# Patient Record
Sex: FEMALE
[De-identification: names, ages, dates, MRNs, and addresses within clinical notes are randomized per-mention and may not be internally consistent; named-entity substitution may affect disease eponyms.]

---

## 2017-09-30 ENCOUNTER — HOSPITAL ENCOUNTER (EMERGENCY)
Dept: HOSPITAL 25 - UCCORT | Age: 27
Discharge: HOME | End: 2017-09-30
Payer: COMMERCIAL

## 2017-09-30 VITALS — SYSTOLIC BLOOD PRESSURE: 154 MMHG | DIASTOLIC BLOOD PRESSURE: 93 MMHG

## 2017-09-30 DIAGNOSIS — Z88.5: ICD-10-CM

## 2017-09-30 DIAGNOSIS — H60.92: Primary | ICD-10-CM

## 2017-09-30 DIAGNOSIS — E11.9: ICD-10-CM

## 2017-09-30 PROCEDURE — 99202 OFFICE O/P NEW SF 15 MIN: CPT

## 2017-09-30 PROCEDURE — G0463 HOSPITAL OUTPT CLINIC VISIT: HCPCS

## 2017-09-30 NOTE — UC
Skin Complaint HPI





- HPI Summary


HPI Summary: 


concerned the piercing on her left Pinna may be infected-area is tender and a 

little red no drainage.








- History of Current Complaint


Chief Complaint: UCSkin


Time Seen by Provider: 09/30/17 20:00


Stated Complaint: INFECTED PIERCING


Hx Obtained From: Patient


Hx Last Menstrual Period: 9/11/17


Pregnant?: No


Onset/Duration: Sudden Onset, Lasting Days - 2


Timing: Constant


Onset Severity: Mild


Current Severity: Mild


Pain Intensity: 2


Pain Scale Used: 0-10 Numeric


Location: Ear (Left)


Character: Redness


Aggravating Factor(s): Nothing


Alleviating Factor(s): Nothing


Associated Signs & Symptoms: Positive: Negative





- Allergy/Home Medications


Allergies/Adverse Reactions: 


 Allergies











Allergy/AdvReac Type Severity Reaction Status Date / Time


 


Morphine Allergy  Itching Verified 09/30/17 19:46














Review of Systems


Constitutional: Negative


Skin: Other - slight erythema left pinna piercing


Eyes: Negative


ENT: Negative


Respiratory: Negative


Cardiovascular: Negative


Gastrointestinal: Negative


Genitourinary: Negative


Motor: Negative


Neurovascular: Negative


Musculoskeletal: Negative


Neurological: Negative


Psychological: Negative


Is Patient Immunocompromised?: No


All Other Systems Reviewed And Are Negative: Yes





PMH/Surg Hx/FS Hx/Imm Hx


Previously Healthy: No


Endocrine History: Diabetes - diet controlled





- Surgical History


Surgical History: Yes


Surgery Procedure, Year, and Place: Right Oopherectomy and Fallopian Tube 

Removed.  Cyst removed from RIGHT breast





- Family History


Known Family History: Positive: None





- Social History


Occupation: Unemployed


Lives: With Family


Alcohol Use: None


Substance Use Type: None


Smoking Status (MU): Never Smoked Tobacco





- Immunization History


Most Recent Influenza Vaccination: NONE 2017





Physical Exam


Triage Information Reviewed: Yes


Appearance: Well-Appearing, No Pain Distress


Vital Signs: 


 Initial Vital Signs











Temp  98.3 F   09/30/17 19:47


 


Pulse  113   09/30/17 19:47


 


Resp  16   09/30/17 19:47


 


BP  154/93   09/30/17 19:47


 


Pulse Ox  100   09/30/17 19:47











Vital Signs Reviewed: Yes


Eye Exam: Normal


Eyes: Positive: Conjunctiva Clear


ENT Exam: Normal


ENT: Positive: Normal ENT inspection, Hearing grossly normal.  Negative: Nasal 

congestion, Nasal drainage, Trismus, Muffled/hoarse voice


Dental Exam: Normal


Neck exam: Normal


Neck: Positive: Supple, Nontender


Respiratory Exam: Normal


Respiratory: Positive: Chest non-tender, No respiratory distress, No accessory 

muscle use


Cardiovascular Exam: Normal


Cardiovascular: Positive: RRR, No Murmur, Pulses Normal, Brisk Capillary Refill

, Other: - always gets tachy due to anxiety at MD office


Musculoskeletal Exam: Normal


Musculoskeletal: Positive: Strength Intact, ROM Intact


Neurological Exam: Normal


Neurological: Positive: Alert, Muscle Tone Normal


Psychological Exam: Normal


Skin Exam: Normal


Skin: Positive: Other - slight erythema left pinna





Course/Dx





- Course


Course Of Treatment: Bactroban, bactrim, warm compress follow with pcp prn





- Differential Diagnoses - Skin Complaint


Differential Diagnoses: Abscess, Cellulitis, Impetigo, Local Allergic Reaction, 

Santoro-Bernard Syndrome





- Diagnoses


Provider Diagnoses: inflammation left pinna





Discharge





- Discharge Plan


Condition: Stable


Disposition: HOME


Prescriptions: 


Sulfamethox/Trimethoprim DS* [Bactrim /160 TAB*] 1 tab PO BID #14 tab


Patient Education Materials:  Pierced Earlobe Infection (ED), Warm Compress or 

Soak (ED)


Referrals: 


Shannon Oh MD [Medical Doctor] - If Needed

## 2018-01-24 ENCOUNTER — HOSPITAL ENCOUNTER (EMERGENCY)
Dept: HOSPITAL 25 - UCCORT | Age: 28
Discharge: LEFT BEFORE DISCHARGE | End: 2018-01-24
Payer: COMMERCIAL

## 2018-01-24 VITALS — DIASTOLIC BLOOD PRESSURE: 91 MMHG | SYSTOLIC BLOOD PRESSURE: 189 MMHG

## 2018-01-24 DIAGNOSIS — R53.83: ICD-10-CM

## 2018-01-24 DIAGNOSIS — R07.89: Primary | ICD-10-CM

## 2018-01-24 DIAGNOSIS — E11.9: ICD-10-CM

## 2018-01-24 DIAGNOSIS — Z85.43: ICD-10-CM

## 2018-01-24 DIAGNOSIS — Z88.5: ICD-10-CM

## 2018-01-24 DIAGNOSIS — R06.02: ICD-10-CM

## 2018-01-24 DIAGNOSIS — I10: ICD-10-CM

## 2018-01-24 PROCEDURE — G0463 HOSPITAL OUTPT CLINIC VISIT: HCPCS

## 2018-01-24 PROCEDURE — 99212 OFFICE O/P EST SF 10 MIN: CPT

## 2018-01-24 NOTE — ED
Respiratory





- HPI Summary


HPI Summary: 





27 yr old female with complaint of pain to the left side of chest described as 

an ache.  Onse two days ago and associated with some SOB.  She has  a history 

of ovarian cancer and is concerned about blood clot.  No fever, chills, cough, 

runny nose.  No other complaints. 





- History of Current Complaint


Chief Complaint: UCGeneralIllness


Stated Complaint: CHESTACHE FATIGUE


Time Seen by Provider: 01/24/18 20:13


Pain Intensity: 4





- Allergy/Home Medications


Allergies/Adverse Reactions: 


 Allergies











Allergy/AdvReac Type Severity Reaction Status Date / Time


 


Morphine Allergy  Itching Verified 01/24/18 20:14











Home Medications: 


 Home Medications





NK [No Home Medications Reported]  01/24/18 [History Confirmed 01/24/18]











PMH/Surg Hx/FS Hx/Imm Hx


Endocrine/Hematology History: Reports: Hx Diabetes - Type 2- diet controlled/no 

meds





- Cancer History


Cancer Type, Location and Year: Ovarian Cancer, 2013





- Surgical History


Surgery Procedure, Year, and Place: Right Oopherectomy and Fallopian Tube 

Removed.  Cyst removed from RIGHT breast


Infectious Disease History: No


Infectious Disease History: 


   Denies: Traveled Outside the [place] in Last 30 Days





- Family History


Known Family History: Positive: None





- Social History


Lives: With Family


Alcohol Use: Rare


Substance Use Type: Reports: None


Smoking Status (MU): Never Smoked Tobacco





Review of Systems


Positive: Chest Pain


Positive: Shortness Of Breath


All Other Systems Reviewed And Are Negative: Yes





Physical Exam


Triage Information Reviewed: Yes


Vital Signs On Initial Exam: 


 Initial Vitals











Temp Pulse Resp BP Pulse Ox


 


 98.3 F   139   20   189/91   100 


 


 01/24/18 20:14  01/24/18 20:14  01/24/18 20:14  01/24/18 20:14  01/24/18 20:14











Vital Signs Reviewed: Yes


Appearance: Positive: Well-Appearing, No Pain Distress


Skin: Positive: Warm, Skin Color Reflects Adequate Perfusion


Head/Face: Positive: Normal Head/Face Inspection


Eyes: Positive: EOMI


Neck: Positive: Nontender


Respiratory/Lung Sounds: Positive: Clear to Auscultation, Breath Sounds Present


Cardiovascular: Positive: Tachycardia.  Negative: Murmur


Abdomen Description: Positive: Nontender


Musculoskeletal: Positive: Normal, Strength/ROM Intact.  Negative: Edema Left, 

Edema Right


Neurological: Positive: Sensory/Motor Intact, Alert, Oriented to Person Place, 

Time, CN Intact II-III


Psychiatric: Positive: Normal





- Margot Coma Scale


Best Eye Response: 4 - Spontaneous


Best Motor Response: 6 - Obeys Commands


Best Verbal Response: 5 - Oriented


Coma Scale Total: 15





Diagnostics





- Vital Signs


 Vital Signs











  Temp Pulse Resp BP Pulse Ox


 


 01/24/18 20:14  98.3 F  139  20  189/91  100














- Laboratory


Lab Statement: Any lab studies that have been ordered have been reviewed, and 

results considered in the medical decision making process.





Disposition





- Course


Course Of Treatment: 27 yr old with CP, SOB, and tachy.  refuses EKG.  refuses 

transport to ER by ambulance.  Signed out AMA with refusal.





- Diagnoses


Provider Diagnoses: 


 Chest pain, Shortness of breath, Hypertension








Discharge





- Discharge Plan


Condition: Good


Disposition: AGAINST MEDICAL ADVICE


Referrals: 


Cristiana Clark MD [Primary Care Provider] -